# Patient Record
Sex: FEMALE | Race: WHITE
[De-identification: names, ages, dates, MRNs, and addresses within clinical notes are randomized per-mention and may not be internally consistent; named-entity substitution may affect disease eponyms.]

---

## 2018-09-06 ENCOUNTER — HOSPITAL ENCOUNTER (OUTPATIENT)
Dept: HOSPITAL 75 - RAD | Age: 63
End: 2018-09-06
Attending: FAMILY MEDICINE
Payer: COMMERCIAL

## 2018-09-06 DIAGNOSIS — R10.31: ICD-10-CM

## 2018-09-06 DIAGNOSIS — K82.8: Primary | ICD-10-CM

## 2018-09-06 LAB
ALBUMIN SERPL-MCNC: 4.2 GM/DL (ref 3.2–4.5)
ALP SERPL-CCNC: 202 U/L (ref 40–136)
ALT SERPL-CCNC: 208 U/L (ref 0–55)
AMYLASE SERPL-CCNC: 28 U/L (ref 25–125)
BASOPHILS # BLD AUTO: 0 10^3/UL (ref 0–0.1)
BASOPHILS NFR BLD AUTO: 0 % (ref 0–10)
BILIRUB SERPL-MCNC: 1.8 MG/DL (ref 0.1–1)
BUN/CREAT SERPL: 9
CALCIUM SERPL-MCNC: 9.6 MG/DL (ref 8.5–10.1)
CHLORIDE SERPL-SCNC: 107 MMOL/L (ref 98–107)
CO2 SERPL-SCNC: 21 MMOL/L (ref 21–32)
CREAT SERPL-MCNC: 0.76 MG/DL (ref 0.6–1.3)
EOSINOPHIL # BLD AUTO: 0 10^3/UL (ref 0–0.3)
EOSINOPHIL NFR BLD AUTO: 0 % (ref 0–10)
ERYTHROCYTE [DISTWIDTH] IN BLOOD BY AUTOMATED COUNT: 14.1 % (ref 10–14.5)
GFR SERPLBLD BASED ON 1.73 SQ M-ARVRAT: > 60 ML/MIN
GLUCOSE SERPL-MCNC: 115 MG/DL (ref 70–105)
HCT VFR BLD CALC: 42 % (ref 35–52)
HGB BLD-MCNC: 13.8 G/DL (ref 11.5–16)
LIPASE SERPL-CCNC: 9 U/L (ref 8–78)
LYMPHOCYTES # BLD AUTO: 1 X 10^3 (ref 1–4)
LYMPHOCYTES NFR BLD AUTO: 10 % (ref 12–44)
MANUAL DIFFERENTIAL PERFORMED BLD QL: NO
MCH RBC QN AUTO: 29 PG (ref 25–34)
MCHC RBC AUTO-ENTMCNC: 33 G/DL (ref 32–36)
MCV RBC AUTO: 89 FL (ref 80–99)
MONOCYTES # BLD AUTO: 0.6 X 10^3 (ref 0–1)
MONOCYTES NFR BLD AUTO: 6 % (ref 0–12)
NEUTROPHILS # BLD AUTO: 8.1 X 10^3 (ref 1.8–7.8)
NEUTROPHILS NFR BLD AUTO: 84 % (ref 42–75)
PLATELET # BLD: 259 10^3/UL (ref 130–400)
PMV BLD AUTO: 10.8 FL (ref 7.4–10.4)
POTASSIUM SERPL-SCNC: 3.9 MMOL/L (ref 3.6–5)
PROT SERPL-MCNC: 7.4 GM/DL (ref 6.4–8.2)
RBC # BLD AUTO: 4.69 10^6/UL (ref 4.35–5.85)
SODIUM SERPL-SCNC: 140 MMOL/L (ref 135–145)
WBC # BLD AUTO: 9.6 10^3/UL (ref 4.3–11)

## 2018-09-06 PROCEDURE — 83690 ASSAY OF LIPASE: CPT

## 2018-09-06 PROCEDURE — 80053 COMPREHEN METABOLIC PANEL: CPT

## 2018-09-06 PROCEDURE — 74178 CT ABD&PLV WO CNTR FLWD CNTR: CPT

## 2018-09-06 PROCEDURE — 36415 COLL VENOUS BLD VENIPUNCTURE: CPT

## 2018-09-06 PROCEDURE — 82150 ASSAY OF AMYLASE: CPT

## 2018-09-06 PROCEDURE — 85025 COMPLETE CBC W/AUTO DIFF WBC: CPT

## 2018-09-06 NOTE — DIAGNOSTIC IMAGING REPORT
PROCEDURE: CT abdomen and pelvis with and without contrast.



TECHNIQUE: Precontrast acquisitions were acquired through the

abdomen and pelvis. Multiple contiguous axial images were

obtained through the abdomen and pelvis after the administration

of intravenous contrast. 



INDICATION:  Right-sided abdominal pain. 



COMPARISON: None.



FINDINGS: Mild linear atelectasis or scarring in the lung bases.

Moderately distended gallbladder. No apparent gallbladder wall

thickening or cholelithiasis. The common bile duct measures up to

1.1 cm in diameter. The liver, pancreas, spleen, adrenals, left

kidney, collecting systems and partially opacified bladder are

negative. A 1.1 cm mass in the right kidney containing

macroscopic fat consistent with a benign angiomyolipoma.

Reproductive structures are grossly unremarkable. Normal

appendix. No free intraperitoneal air or fluid. No

lymphadenopathy. No evidence of bowel obstruction or

inflammation. No acute osseous findings.



IMPRESSION: 

Distended gallbladder. Prominent common bile duct measuring up to

1.1 cm in diameter. No apparent gallbladder wall thickening or

cholelithiasis. This could be further investigated with dedicated

ultrasound.



Findings discussed with Dr. Mohsen Pinto at 8:15 PM on

09/06/2018.













Dictated by: 



  Dictated on workstation # [id number]

## 2018-09-21 ENCOUNTER — HOSPITAL ENCOUNTER (OUTPATIENT)
Dept: HOSPITAL 75 - RAD | Age: 63
End: 2018-09-21
Attending: SURGERY
Payer: COMMERCIAL

## 2018-09-21 DIAGNOSIS — K80.20: Primary | ICD-10-CM

## 2018-09-21 PROCEDURE — 76705 ECHO EXAM OF ABDOMEN: CPT

## 2018-09-21 NOTE — DIAGNOSTIC IMAGING REPORT
PROCEDURE: US Gallbladder.



TECHNIQUE: Multiple real-time grayscale images were obtained over

the right upper quadrant in various projections.



INDICATION:  Abdominal pain.



There are no previous gallbladder ultrasound examinations

available for comparison.



FINDINGS: The CT abdomen/pelvis exam of 09/06/2018 did note that

the gallbladder was distended and that the common bile duct was

prominent, measuring 1.1 CM (normal 0.6 CM or less). There was no

evidence for cholelithiasis or acute cholecystitis, however.



On this study, there do appear to be a number of small gallstones

within the gallbladder. The gallbladder wall is not thickened and

there is no pericholecystic fluid to suggest acute cholecystitis.

The common bile duct also measures less than on the prior exam

and is now estimated to be approximately 0.6 CM.



The right kidney is unremarkable. The pancreas and proximal aorta

were obscured by bowel gas.



IMPRESSION:

1. There is cholelithiasis, but there is no evidence for acute

cholecystitis. The common bile duct is no longer dilated.

2. If clinical concern regarding an acute abnormality of the

gallbladder persists, then a nuclear medicine hepatobiliary scan

would be recommended for further study.

3. These results were discussed with Dr. Mcclure.



Dictated by: 



  Dictated on workstation # [id number]

## 2018-10-04 ENCOUNTER — HOSPITAL ENCOUNTER (OUTPATIENT)
Dept: HOSPITAL 75 - PREOP | Age: 63
Discharge: HOME | End: 2018-10-04
Attending: SURGERY
Payer: COMMERCIAL

## 2018-10-04 VITALS — HEIGHT: 69 IN | BODY MASS INDEX: 35.4 KG/M2 | WEIGHT: 239 LBS

## 2018-10-04 VITALS — DIASTOLIC BLOOD PRESSURE: 84 MMHG | SYSTOLIC BLOOD PRESSURE: 135 MMHG

## 2018-10-04 DIAGNOSIS — Z01.818: Primary | ICD-10-CM

## 2018-10-04 PROCEDURE — 87081 CULTURE SCREEN ONLY: CPT

## 2018-10-11 ENCOUNTER — HOSPITAL ENCOUNTER (OUTPATIENT)
Dept: HOSPITAL 75 - SDC | Age: 63
Discharge: HOME | End: 2018-10-11
Attending: SURGERY
Payer: COMMERCIAL

## 2018-10-11 VITALS — SYSTOLIC BLOOD PRESSURE: 98 MMHG | DIASTOLIC BLOOD PRESSURE: 65 MMHG

## 2018-10-11 VITALS — SYSTOLIC BLOOD PRESSURE: 109 MMHG | DIASTOLIC BLOOD PRESSURE: 63 MMHG

## 2018-10-11 VITALS — DIASTOLIC BLOOD PRESSURE: 63 MMHG | SYSTOLIC BLOOD PRESSURE: 109 MMHG

## 2018-10-11 VITALS — SYSTOLIC BLOOD PRESSURE: 124 MMHG | DIASTOLIC BLOOD PRESSURE: 74 MMHG

## 2018-10-11 VITALS — WEIGHT: 239 LBS | HEIGHT: 69 IN | BODY MASS INDEX: 35.4 KG/M2

## 2018-10-11 VITALS — DIASTOLIC BLOOD PRESSURE: 69 MMHG | SYSTOLIC BLOOD PRESSURE: 108 MMHG

## 2018-10-11 DIAGNOSIS — E66.01: ICD-10-CM

## 2018-10-11 DIAGNOSIS — K80.10: Primary | ICD-10-CM

## 2018-10-11 PROCEDURE — 88304 TISSUE EXAM BY PATHOLOGIST: CPT

## 2018-10-11 PROCEDURE — 94664 DEMO&/EVAL PT USE INHALER: CPT

## 2018-10-11 RX ADMIN — SODIUM CHLORIDE, SODIUM LACTATE, POTASSIUM CHLORIDE, AND CALCIUM CHLORIDE PRN MLS/HR: 600; 310; 30; 20 INJECTION, SOLUTION INTRAVENOUS at 08:25

## 2018-10-11 RX ADMIN — SODIUM CHLORIDE, SODIUM LACTATE, POTASSIUM CHLORIDE, AND CALCIUM CHLORIDE PRN MLS/HR: 600; 310; 30; 20 INJECTION, SOLUTION INTRAVENOUS at 07:10

## 2018-10-11 NOTE — DIAGNOSTIC IMAGING REPORT
EXAMINATION: Fluoroscopy



INDICATION: Abdominal pain



Fluoroscopic assistance was provided for Dr. Mcclrue. 22 seconds

of fluoroscopy time was utilized. 122 images of the right upper

quadrant were received from the or. There is a laparoscopic

device in place. There has been opacification of the common bile

duct  via the cystic duct catheter. The common bile duct is

slightly dilated but there is no defect within the duct to

suggest a calculus. A portion of the duct however is obscured by

one of the laparoscopic devices. There is no extension of the

contrast into the small bowel.



Impression:



Fluoroscopic assistance was provided for Dr. Mcclure. 



Dictated by: 



  Dictated on workstation # [id number]

## 2018-10-11 NOTE — OPERATIVE REPORT
DATE OF SERVICE:  10/11/2018



PREOPERATIVE DIAGNOSES:

Right upper quadrant abdominal pain; symptomatic cholelithiasis.



POSTOPERATIVE DIAGNOSES:

Right upper quadrant abdominal pain; symptomatic cholelithiasis and

choledocholithiasis.



PROCEDURE:

Laparoscopic cholecystectomy with intraoperative cholangiogram.



SURGEON:

Alban Mcclure DO.



ASSISTANT:

Dr. Amador, assisted in retraction, dissection and closure.



ANESTHESIA:

General.



ESTIMATED BLOOD LOSS:

Minimal.



COMPLICATIONS:

None.



INDICATIONS:

The patient is a 63-year-old female with right upper quadrant abdominal pain. 

She is also demonstrating gallstones.  She previously had a slightly dilated

common bile duct.  She understands risks and benefits of the procedure and

wished to proceed with procedure.  Consent was signed on the chart.



DESCRIPTION OF PROCEDURE:

The patient was taken to the operating suite.  She was prepped and draped in a

sterile fashion.  Surgical pause was performed.  Marie technique was used to

enter the abdomen just above the umbilicus.  A 0 Vicryl was placed in a

figure-of-eight fashion at the fascia for closure at the end.  A balloon trocar

was inserted into the abdomen and pneumoperitoneum was achieved.  Under direct

visualization of the laparoscope, a 5 mm trocar was then placed in the

subxiphoid region and two 5 mm trocars were placed in the right upper quadrant. 

Gallbladder was grasped and elevated.  Some adhesions were stuck to the neck of

the gallbladder, which were then begun to be dissected off bluntly and with some

slight cautery with [place] dissection.  The cystic duct and cystic artery were

then dissected out.  Clips were placed on the proximal and distal portion of the

cystic artery and a clip was placed in the distal portion of the cystic duct. 

The duct was then partially transected.  Arrow catheter was inserted, difficult

to insert and a stone within the cystic duct was able to be manipulated out. 

The Arrow catheter was then inserted into the cystic duct and cholangiogram was

then performed.  There was a small filling defect in the distal portion of the

common bile duct and no contrast made its way into the duodenum consistent with

choledocholithiasis.  The Arrow catheter was removed.  Clips were placed on the

proximal portion of the cystic duct and the duct and artery were then completely

transected.  Hook cautery was used to dissect the gallbladder from the

gallbladder fossa achieving hemostasis.  Once removed, it was placed in an

Endobag and removed through the 12 mm trocar site.  The abdomen was then

irrigated and suctioned with copious amounts of irrigation.  Hemostasis had been

achieved.  The abdomen was then desufflated and the trocars were removed.  The 0

Vicryl was then secured closing the fascia at the 12 mm trocar site.  The skin

was then closed using 4-0 Monocryl in a subcuticular fashion.  The abdomen was

then washed and dried and Skin Affix was placed over the incisions.  The patient

tolerated procedure well without complications.  She was taken to the recovery

room in stable condition.  A transfer has been arranged to  ____[facility] for ERCP.





Job ID: [id number]

DocumentID: [id number]

Dictated Date:  10/11/2018 09:21:24

Transcription Date: 10/11/2018 10:12:01

Dictated By: ALBAN MCCLURE DO

## 2018-10-11 NOTE — ANESTHESIA-GENERAL POST-OP
General


Patient Condition


Mental Status/LOC:  Same as Preop


Cardiovascular:  Satisfactory


Nausea/Vomiting:  Absent


Respiratory:  Satisfactory


Pain:  Controlled


Complications:  Absent





Post Op Complications


Complications


None





Follow Up Care/Instructions


Patient Instructions


None needed.





Anesthesia/Patient Condition


Patient Condition


Patient is doing well, no complaints, stable vital signs, no apparent adverse 

anesthesia problems.   


No complications reported per nursing.











IZABEL GRISSOM CRNA Oct 11, 2018 11:38

## 2018-10-11 NOTE — DISCHARGE INST-SIMPLE/STANDARD
Discharge Inst-Standard


Discharge Medications


New, Converted or Re-Newed RX:  RX on Chart





Patient Instructions/Follow Up


Plan of Care/Instructions/FU:  


2 weeks Kami





Do not eat or drink anything until cleared with Dr. Baca


Activity as Tolerated:  No


Discharge Diet:  Other Diet (nothing by mouth until cleared by Dr. Baca then 

regular diet.)


Other Inst to Patient


Follow up Appt:


Make appointment for 2 weeks.





Instructions:


No lifting greater than 10 pounds.


No strenuous activity. 


May shower in 24 hours, no tub bath or soaking.


Use incentive spirometer at home as directed.


No Smoking





Skin/Wound Care:


You have special glue over incisions it will fall off on its own.





Symptoms to Report:


Appetite Changes, Extremity Discoloration, Numbness/Tingling, Swelling Increased

, Bleeding Excessive, Eyesight Changes, Pain Increased, Urine Color Change, 

Constipation(Persistent), Fever over 101 degree F, Pain/Pressure in chest, 

Urinating Difficulty, Cough Up/Vomit Blood, Heart Beat Irreg/Pounding, Pain/

Pressure in jaw, Vaginal Bleeding Increase, Cramps in feet or legs, 

Lightheadedness, Pain/Pressure in shoulder, Diarrhea(Persistent), Memory 

Changes Suddenly, Questions/Concerns, Weight gain consecutive days, Dizziness/

Fainting, Nausea/Vomiting, Shortness of Breath, Weight gain over 2 pounds.





If eyes or skin turn yellow notify physician.








If questions or concerns contact your physician 


Or seek help at emergency department.











ALBAN SALTER DO Oct 11, 2018 09:04

## 2018-10-11 NOTE — PROGRESS NOTE-PRE OPERATIVE
Pre-Operative Progress Note


H&P Reviewed


The H&P was reviewed, patient examined and no changes noted.


Date Seen by Provider:  Oct 11, 2018


Time Seen by Provider:  07:59


Date H&P Reviewed:  Oct 11, 2018


Time H&P Reviewed:  07:59


Pre-Operative Diagnosis:  ruq abdominal pain, symptomatic cholelithiasis











ALBAN SALTER DO Oct 11, 2018 08:00

## 2018-10-11 NOTE — PROGRESS NOTE-POST OPERATIVE
Post-Operative Progess Note


Surgeon (s)/Assistant (s)


Surgeon


ALBAN SALTER DO


Assistant:  Dr. Amador





Pre-Operative Diagnosis


ruq abdominal pain, symptomatic cholelithiasis





Post-Operative Diagnosis





choledocholithiasis, symptomatic cholelithiasis





Procedure & Operative Findings


Date of Procedure


10/11/18


Procedure Performed/Findings


lap agnieszka c ioc


Anesthesia Type


gen





Estimated Blood Loss


Estimated blood loss (mL):  min





Specimens/Packing


Specimens Removed


gallbladder











ALBAN SALTER DO Oct 11, 2018 09:01

## 2023-03-13 ENCOUNTER — HOSPITAL ENCOUNTER (EMERGENCY)
Dept: HOSPITAL 75 - ER | Age: 68
Discharge: HOME | End: 2023-03-13
Payer: MEDICARE

## 2023-03-13 VITALS — HEIGHT: 68.9 IN | BODY MASS INDEX: 33.31 KG/M2 | WEIGHT: 224.87 LBS

## 2023-03-13 VITALS — SYSTOLIC BLOOD PRESSURE: 142 MMHG | DIASTOLIC BLOOD PRESSURE: 85 MMHG

## 2023-03-13 DIAGNOSIS — H81.13: Primary | ICD-10-CM

## 2023-03-13 LAB
BASOPHILS # BLD AUTO: 0 10^3/UL (ref 0–0.1)
BASOPHILS NFR BLD AUTO: 0 % (ref 0–10)
BUN/CREAT SERPL: 16
CALCIUM SERPL-MCNC: 9.2 MG/DL (ref 8.5–10.1)
CHLORIDE SERPL-SCNC: 108 MMOL/L (ref 98–107)
CO2 SERPL-SCNC: 21 MMOL/L (ref 21–32)
CREAT SERPL-MCNC: 0.7 MG/DL (ref 0.6–1.3)
EOSINOPHIL # BLD AUTO: 0.1 10^3/UL (ref 0–0.3)
EOSINOPHIL NFR BLD AUTO: 1 % (ref 0–10)
GFR SERPLBLD BASED ON 1.73 SQ M-ARVRAT: 95 ML/MIN
GLUCOSE SERPL-MCNC: 107 MG/DL (ref 70–105)
HCT VFR BLD CALC: 44 % (ref 35–52)
HGB BLD-MCNC: 14.4 G/DL (ref 11.5–16)
LYMPHOCYTES # BLD AUTO: 1.1 10^3/UL (ref 1–4)
LYMPHOCYTES NFR BLD AUTO: 15 % (ref 12–44)
MANUAL DIFFERENTIAL PERFORMED BLD QL: NO
MCH RBC QN AUTO: 30 PG (ref 25–34)
MCHC RBC AUTO-ENTMCNC: 33 G/DL (ref 32–36)
MCV RBC AUTO: 91 FL (ref 80–99)
MONOCYTES # BLD AUTO: 0.5 10^3/UL (ref 0–1)
MONOCYTES NFR BLD AUTO: 7 % (ref 0–12)
NEUTROPHILS # BLD AUTO: 5.6 10^3/UL (ref 1.8–7.8)
NEUTROPHILS NFR BLD AUTO: 76 % (ref 42–75)
PLATELET # BLD: 227 10^3/UL (ref 130–400)
PMV BLD AUTO: 10.5 FL (ref 9–12.2)
POTASSIUM SERPL-SCNC: 3.8 MMOL/L (ref 3.6–5)
SODIUM SERPL-SCNC: 140 MMOL/L (ref 135–145)
WBC # BLD AUTO: 7.3 10^3/UL (ref 4.3–11)

## 2023-03-13 PROCEDURE — 85025 COMPLETE CBC W/AUTO DIFF WBC: CPT

## 2023-03-13 PROCEDURE — 36415 COLL VENOUS BLD VENIPUNCTURE: CPT

## 2023-03-13 PROCEDURE — 99283 EMERGENCY DEPT VISIT LOW MDM: CPT

## 2023-03-13 PROCEDURE — 80048 BASIC METABOLIC PNL TOTAL CA: CPT

## 2023-03-13 NOTE — ED GENERAL
General


Chief Complaint:  General Problems/Pain


Stated Complaint:  DIZZINESS | VOMITING


Source of Information:  Patient


Exam Limitations:  No Limitations





History of Present Illness


Date Seen by Provider:  Mar 13, 2023


Time Seen by Provider:  10:36


Initial Comments


Patient is a 67-year-old female who presents to the emergency room with a chief 

complaint of dizziness.  Patient states she had some mild symptoms about 10 days

ago after having a bit of a sinus infection.  She did not take antibiotics.  She

states she took some Advil cold and flu.  She states this morning when she woke 

up as she was getting ready for her day she started having worsening symptoms of

dizziness that caused her to feel quite nauseated and she vomited several times 

at home.  She denies headache, double vision.  She states her ears feel "full" 

like there is fluid in them.  No sore throat, fevers, chills, productive cough. 

No diarrhea.  No urinary complaints.  No recent trauma.  No headache.





Patient states staying completely still improves her dizziness.  Any movement 

worsens it.





Patient has a negative past medical history she states that her only medical 

problem is "bad knees".  She does not take medication for diabetes, hypertension

and heart disease etc.  Last time she had a general medical exam was about 2 

years ago when she had a cholecystectomy.





Resting in the bed she states she feels fine.  Vital signs are completely 

stable.


Timing/Duration:  1-3 Hours


Severity:  Severe


Associated Systoms:  Nausea/Vomiting





Allergies and Home Medications


Allergies


Coded Allergies:  


     erythromycin base (Verified  Allergy, Unknown, NAUSEA, 10/4/18)





Patient Home Medication List


Home Medication List Reviewed:  Yes


Docusate Sodium (Colace) 100 Mg Capsule, 100 MG PO DAILY


   Prescribed by: ALBAN SALTER on 10/11/18 0902


Hydrocodone Bit/Acetaminophen (Lortab  5 Mg Tablet) 1 Tab Tab, 1-2 TAB PO Q6H 

PRN for PAIN-MODERATE


   Prescribed by: ALBAN SALTER on 10/11/18 0902


Vit C/E/Zn/Coppr/Lutein/Zeaxan (Preservision Areds 2 Softgel) 1 Each Capsule, 1 

EACH PO DAILY, (Reported)


   Entered as Reported by: ELMA STRICKLAND on 10/4/18 6873





Review of Systems


Review of Systems


Constitutional:  see HPI, dizziness


EENTM:  other (Ears feel full)


Respiratory:  no symptoms reported


Cardiovascular:  no symptoms reported


Gastrointestinal:  nausea, vomiting


Genitourinary:  no symptoms reported


Pregnant:  No


Musculoskeletal:  no symptoms reported


Skin:  no symptoms reported


Psychiatric/Neurological:  Other (Dizziness)





All Other Systems Reviewed


Negative Unless Noted:  Yes





Past Medical-Social-Family Hx


Seasonal Allergies


Seasonal Allergies:  Yes





Past Medical History


Tonsillectomy


Gall Bladder Disease


Arthritis


Macular Degeneration





Physical Exam


Vital Signs





Vital Signs - First Documented








 3/13/23





 10:14


 


Temp 35.6


 


Pulse 81


 


Resp 18


 


B/P (MAP) 141/80 (100)


 


Pulse Ox 97


 


O2 Delivery Room Air





Capillary Refill :


Height, Weight, BMI


Height: 5'9.00"


Weight: 239lbs. 0.0oz. 108.466795md; 35.3 BMI


Method:


General Appearance:  No Apparent Distress, WD/WN


Eyes:  Bilateral Eye Normal Inspection, Bilateral Eye PERRL, Bilateral Eye EOMI


HEENT:  PERRL/EOMI, Pharynx Normal, Moist Mucous Membranes, TM Abnormal (R) 

(Effusion right TM, left appears)


Neck:  Full Range of Motion, Normal Inspection, Non Tender, Supple


Respiratory:  Lungs Clear, Normal Breath Sounds, No Accessory Muscle Use, No 

Respiratory Distress


Cardiovascular:  Regular Rate, Rhythm, Normal Peripheral Pulses


Gastrointestinal:  Normal Bowel Sounds, Non Tender, Soft


Extremity:  Normal Capillary Refill, Normal Range of Motion, No Pedal Edema


Neurologic/Psychiatric:  Alert, Oriented x3, No Motor/Sensory Deficits, Normal 

Mood/Affect, CNs II-XII Norm as Tested, Other (Patient has pretty significant 

nystagmus laying flat with her head turned to the left and attempting to look at

the ceiling, i.e. rightward.  She cannot tolerate doing the same maneuver on the

left.;  No cerebellar findings)


Skin:  Normal Color, Warm/Dry





Progress/Results/Core Measures


Suspected Sepsis


SIRS


Temperature: 


Pulse:  


Respiratory Rate: 


 


Laboratory Tests


3/13/23 10:51: White Blood Count 7.3


Blood Pressure  / 


Mean: 


 











Laboratory Tests


3/13/23 10:51: 


Creatinine 0.70, Platelet Count 227





Results/Orders


Lab Results





Laboratory Tests








Test


 3/13/23


10:51 Range/Units


 


 


White Blood Count


 7.3 


 4.3-11.0


10^3/uL


 


Red Blood Count


 4.81 


 3.80-5.11


10^6/uL


 


Hemoglobin 14.4  11.5-16.0  g/dL


 


Hematocrit 44  35-52  %


 


Mean Corpuscular Volume 91  80-99  fL


 


Mean Corpuscular Hemoglobin 30  25-34  pg


 


Mean Corpuscular Hemoglobin


Concent 33 


 32-36  g/dL





 


Red Cell Distribution Width 13.3  10.0-14.5  %


 


Platelet Count


 227 


 130-400


10^3/uL


 


Mean Platelet Volume 10.5  9.0-12.2  fL


 


Immature Granulocyte % (Auto) 0   %


 


Neutrophils (%) (Auto) 76 H 42-75  %


 


Lymphocytes (%) (Auto) 15  12-44  %


 


Monocytes (%) (Auto) 7  0-12  %


 


Eosinophils (%) (Auto) 1  0-10  %


 


Basophils (%) (Auto) 0  0-10  %


 


Neutrophils # (Auto)


 5.6 


 1.8-7.8


10^3/uL


 


Lymphocytes # (Auto)


 1.1 


 1.0-4.0


10^3/uL


 


Monocytes # (Auto)


 0.5 


 0.0-1.0


10^3/uL


 


Eosinophils # (Auto)


 0.1 


 0.0-0.3


10^3/uL


 


Basophils # (Auto)


 0.0 


 0.0-0.1


10^3/uL


 


Immature Granulocyte # (Auto)


 0.0 


 0.0-0.1


10^3/uL


 


Sodium Level 140  135-145  MMOL/L


 


Potassium Level 3.8  3.6-5.0  MMOL/L


 


Chloride Level 108 H   MMOL/L


 


Carbon Dioxide Level 21  21-32  MMOL/L


 


Anion Gap 11  5-14  MMOL/L


 


Blood Urea Nitrogen 11  7-18  MG/DL


 


Creatinine


 0.70 


 0.60-1.30


MG/DL


 


Estimat Glomerular Filtration


Rate 95 


  





 


BUN/Creatinine Ratio 16   


 


Glucose Level 107 H   MG/DL


 


Calcium Level 9.2  8.5-10.1  MG/DL








My Orders





Orders - GEORGE ESTEVEZ MD


Cbc With Automated Diff (3/13/23 10:48)


Basic Metabolic Panel (3/13/23 10:48)


Meclizine Tablet (Antivert Tablet) (3/13/23 11:00)


Ondansetron  Oral Dissolve Tab (Zofran (3/13/23 11:00)





Medications Given in ED





Current Medications








 Medications  Dose


 Ordered  Sig/Sabrina


 Route  Start Time


 Stop Time Status Last Admin


Dose Admin


 


 Meclizine HCl  25 mg  ONCE  ONCE


 PO  3/13/23 11:00


 3/13/23 11:01 DC 3/13/23 10:54


25 MG


 


 Ondansetron HCl  4 mg  ONCE  ONCE


 PO  3/13/23 11:00


 3/13/23 11:01 DC 3/13/23 10:54


4 MG








Vital Signs/I&O











 3/13/23 3/13/23





 10:14 10:58


 


Temp 35.6 


 


Pulse 81 75


 


Resp 18 18


 


B/P (MAP) 141/80 (100) 114/77 (89)


 


Pulse Ox 97 91


 


O2 Delivery Room Air Room Air





Capillary Refill :


Progress Note :  


   Time:  11:53


Progress Note


Patient seen and evaluated, evaluation today includes physical exam, basic 

metabolic panel and CBC.  Pertinent physical exam findings nystagmus to the 

right greater than the left however both directions produce nystagmus.  No other

focal neurologic deficits on exam.  Heart is regular, lungs are clear abdomen is

soft, no lower extremity edema.  Vital signs are perfect.





Differential diagnosis based on history and physical exam concern for cerebellar

stroke, intracranial tumor, benign positional vertigo.





Patient's labs reviewed, all within normal limits, normal electrolytes, normal 

renal function, not anemic.  Patient was treated in the emergency room with 

Zofran and meclizine.  After approximately 30 minutes the patient was reassessed

and states she is feeling better.  Slight feeling of "dizziness" with head 

turning to the right but otherwise feels improved.  I discussed with her 

meclizine and Zofran for symptom management as well as over-the-counter Claritin

or equivalent with Mucinex for congestion.  Patient is happy with the plan of 

care.  All questions are sought and answered.  Patient stable for discharge





Departure


Impression





   Primary Impression:  


   Benign positional vertigo


Disposition:  01 HOME, SELF-CARE


Condition:  Improved





Departure-Patient Inst.


Decision time for Depature:  11:56


Referrals:  


HUMPHREY HERNADEZ,LOCAL PHYSICIAN (PCP)


Primary Care Physician


Patient Instructions:  Vertigo ED





Add. Discharge Instructions:  


Drink plenty of fluids to stay well-hydrated.





You can take over-the-counter Claritin or Zyrtec or Allegra for congestion as 

well as Mucinex to help thin secretions.





Meclizine, 25 mg every 6 hours as needed for dizziness.  You may need to take 

this for the next couple of days.





Zofran 4 mg orally dissolving tablets 1 every 8 hours as needed for nausea.





If you develop any worsening dizziness especially with headache, fever or any 

other emergent concerns please return to the emergency room for reevaluation.


Scripts


Ondansetron (Ondansetron Odt) 4 Mg Tab.rapdis


4 MG SL Q8H PRN for NAUSEA/VOMITING, #12 TAB


   Prov: GEORGE ESTEVEZ MD         3/13/23 


Meclizine HCl (Meclizine HCl) 25 Mg Tablet


25 MG PO Q6H PRN for dizziness, #30 TAB


   Prov: GEORGE ESTEVEZ MD         3/13/23











GEORGE ESTEVEZ MD         Mar 13, 2023 10:22